# Patient Record
Sex: FEMALE | Race: WHITE | HISPANIC OR LATINO | Employment: FULL TIME | URBAN - METROPOLITAN AREA
[De-identification: names, ages, dates, MRNs, and addresses within clinical notes are randomized per-mention and may not be internally consistent; named-entity substitution may affect disease eponyms.]

---

## 2017-01-20 ENCOUNTER — HOSPITAL ENCOUNTER (EMERGENCY)
Facility: OTHER | Age: 62
Discharge: HOME | End: 2017-01-20
Attending: EMERGENCY MEDICINE
Payer: COMMERCIAL

## 2017-01-20 VITALS
RESPIRATION RATE: 20 BRPM | SYSTOLIC BLOOD PRESSURE: 146 MMHG | HEART RATE: 92 BPM | HEIGHT: 59 IN | WEIGHT: 123 LBS | OXYGEN SATURATION: 97 % | BODY MASS INDEX: 24.8 KG/M2 | DIASTOLIC BLOOD PRESSURE: 89 MMHG | TEMPERATURE: 98 F

## 2017-01-20 DIAGNOSIS — J10.1 INFLUENZA A: Primary | ICD-10-CM

## 2017-01-20 PROCEDURE — 87804 INFLUENZA ASSAY W/OPTIC: CPT

## 2017-01-20 PROCEDURE — 99284 EMERGENCY DEPT VISIT MOD MDM: CPT | Mod: 25

## 2017-01-20 PROCEDURE — 25000003 PHARM REV CODE 250: Performed by: EMERGENCY MEDICINE

## 2017-01-20 RX ORDER — IBUPROFEN 800 MG/1
800 TABLET ORAL EVERY 6 HOURS PRN
Qty: 20 TABLET | Refills: 0 | Status: SHIPPED | OUTPATIENT
Start: 2017-01-20

## 2017-01-20 RX ORDER — OSELTAMIVIR PHOSPHATE 75 MG/1
75 CAPSULE ORAL 2 TIMES DAILY
Qty: 10 CAPSULE | Refills: 0 | Status: SHIPPED | OUTPATIENT
Start: 2017-01-20 | End: 2017-01-25

## 2017-01-20 RX ORDER — CODEINE PHOSPHATE AND GUAIFENESIN 10; 100 MG/5ML; MG/5ML
5 SOLUTION ORAL EVERY 4 HOURS PRN
Qty: 180 ML | Refills: 0 | Status: SHIPPED | OUTPATIENT
Start: 2017-01-20 | End: 2017-01-30

## 2017-01-20 RX ORDER — OSELTAMIVIR PHOSPHATE 75 MG/1
75 CAPSULE ORAL
Status: COMPLETED | OUTPATIENT
Start: 2017-01-20 | End: 2017-01-20

## 2017-01-20 RX ADMIN — OSELTAMIVIR PHOSPHATE 75 MG: 75 CAPSULE ORAL at 06:01

## 2017-01-20 NOTE — ED AVS SNAPSHOT
[facility] EMERGENCY DEPARTMENT  [address] [facility] [address]               Patricia Sales   2017  5:25 PM   ED    Description:  Female : 1955   Department:  [facility]           Your Care was Coordinated By:     Provider Role From To    Ana Laura Sanchez MD Attending Provider 17 1733 --      Reason for Visit     URI           Diagnoses this Visit        Comments    Influenza A    -  Primary       ED Disposition     None           To Do List           Follow-up Information     Schedule an appointment as soon as possible for a visit with Lizzeth Minor MD.    Specialty:  Family Medicine    Contact information:    [address]  [facility] [address]  [phone]          Follow up with [facility].    Specialty:  Emergency Medicine    Why:  If symptoms worsen    Contact information:    [address] [facility] [address]  [phone]       These Medications        Disp Refills Start End    guaifenesin-codeine 100-10 mg/5 ml (TUSSI-ORGANIDIN NR)  mg/5 mL syrup 180 mL 0 2017    Take 5 mLs by mouth every 4 (four) hours as needed for Cough. - Oral    Pharmacy: [facility] [address] [place] [address] AT [facility] #: [phone]       ibuprofen (ADVIL,MOTRIN) 800 MG tablet 20 tablet 0 2017     Take 1 tablet (800 mg total) by mouth every 6 (six) hours as needed for Pain. - Oral    Pharmacy: [facility] [address] AT [facility] #: [phone]       oseltamivir (TAMIFLU) 75 MG capsule 10 capsule 0 2017    Take 1 capsule (75 mg total) by mouth 2 (two) times daily. - Oral    Pharmacy: [facility] [address] AT [facility] #: [phone]         [facility]     [facility] -  Assistance  Registered nurses in the  [facility] provide clinical advisement, health education, appointment booking, and other advisory services.  Call for this free service at [phone].             Medications           Message regarding Medications     Verify the changes and/or additions to your medication regime listed below are the same as discussed with your clinician today.  If any of these changes or additions are incorrect, please notify your healthcare provider.        START taking these NEW medications        Refills    guaifenesin-codeine 100-10 mg/5 ml (TUSSI-ORGANIDIN NR)  mg/5 mL syrup 0    Sig: Take 5 mLs by mouth every 4 (four) hours as needed for Cough.    Class: Print    Route: Oral    ibuprofen (ADVIL,MOTRIN) 800 MG tablet 0    Sig: Take 1 tablet (800 mg total) by mouth every 6 (six) hours as needed for Pain.    Class: Print    Route: Oral    oseltamivir (TAMIFLU) 75 MG capsule 0    Sig: Take 1 capsule (75 mg total) by mouth 2 (two) times daily.    Class: Print    Route: Oral      These medications were administered today        Dose Freq    oseltamivir capsule 75 mg 75 mg ED 1 Time    Sig: Take 1 capsule (75 mg total) by mouth ED 1 Time.    Class: Normal    Route: Oral           Verify that the below list of medications is an accurate representation of the medications you are currently taking.  If none reported, the list may be blank. If incorrect, please contact your healthcare provider. Carry this list with you in case of emergency.           Current Medications     acetaminophen (TYLENOL) 500 MG tablet Take 2 tablets (1,000 mg total) by mouth 3 (three) times daily as needed for Pain.    CALCIUM CARBONATE (CALCIUM 500 ORAL) Take by mouth.    guaifenesin-codeine 100-10 mg/5 ml (TUSSI-ORGANIDIN NR)  mg/5 mL syrup Take 5 mLs by mouth every 4 (four) hours as needed for Cough.    hydrophilic cream-urea Lotn Apply cream to the sole of both feet twice daily.    ibuprofen (ADVIL,MOTRIN) 800 MG tablet Take 1  "tablet (800 mg total) by mouth every 6 (six) hours as needed for Pain.    oseltamivir (TAMIFLU) 75 MG capsule Take 1 capsule (75 mg total) by mouth 2 (two) times daily.    oseltamivir capsule 75 mg Take 1 capsule (75 mg total) by mouth ED 1 Time.           Clinical Reference Information           Your Vitals Were     BP Pulse Temp Resp Height Weight    146/89 (BP Location: Left arm, Patient Position: Sitting) 92 97.6 °F (36.4 °C) (Temporal) 20 4' 11" (1.499 m) 55.8 kg (123 lb)    SpO2 BMI             97% 24.84 kg/m2         Allergies as of 1/20/2017        Reactions    Demerol [Meperidine] Other (See Comments)    Loss of consciousness      Immunizations Administered on Date of Encounter - 1/20/2017     None      ED Micro, Lab, POCT     Start Ordered       Status Ordering Provider    01/20/17 1706 01/20/17 1705  POCT Influenza A/B  Once      Ordered       ED Imaging Orders     None        Discharge Instructions         Influenza  Influenza (the flu) is an infection that affects your respiratory tract (the mouth, nose, and lungs, and the passages between them). Unlike a cold, the flu can make you very ill. And it can lead to pneumonia, a serious lung infection. For some people, especially older adults, young children, and people with certain chronic conditions, the flu can have serious complications and even be fatal.  What Are the Risk Factors for the Flu?     Viruses that cause influenza spread through the air in droplets when someone who has the flu coughs, sneezes, laughs, or talks.   Anyone can get the flu. But youre more likely to become infected if you:  · Have a weakened immune system.  · Work in a health care setting where you may be exposed to flu germs.  · Live or work with someone who has the flu.  · Havent received an annual flu shot.  How Does the Flu Spread?  The flu is caused by viruses. The viruses spread through the air in droplets when someone who has the flu coughs, sneezes, laughs, or talks. " You can become infected when you inhale these viruses directly. You can also become infected when you touch a surface on which the droplets have landed and then transfer the germs to your eyes, nose, or mouth. Touching used tissues, or sharing utensils, drinking glasses, or a toothbrush with an infected person can expose you to flu viruses, too.  What Are the Symptoms of the Flu?  Flu symptoms tend to come on quickly and may last a few days to a few weeks. They include:  · Fever usually higher than 101°F  (38.3°C) and chills  · Sore throat and headache  · Dry cough  · Runny nose  · Tiredness and weakness  · Muscle aches  Factors That Can Make Flu Worse  For some people, the flu can be very serious. The risk of complications is greater for:  · Children under age 5.  · Adults 65 years of age and older.  · People with a chronic illness, such as diabetes or heart, kidney, or lung disease.  · People who live in a nursing home or long-term care facility.   How Is the Flu Treated?  Influenza usually improves after 7 days or so. In some cases, your health care provider may prescribe an antiviral medication. This may help you get well sooner. For the medication to help, you need to take it as soon as possible (ideally within 48 hours) after your symptoms start. If you develop pneumonia or other serious illness, hospital care may be needed.  Easing Flu Symptoms  · Drink lots of fluids such as water, juice, and warm soup. A good rule is to drink enough so that you urinate your normal amount.  · Get plenty of rest.  · Ask your health care provider what to take for fever and pain.  · Call your provider if your fever rises over 101°F (38.3°C) or you become dizzy, lightheaded, or short of breath.  Taking Steps to Protect Others  · Wash your hands often, especially after coughing or sneezing. Or, clean your hands with an alcohol-based hand  containing at least 60 percent alcohol.  · Cough or sneeze into a tissue. Then throw  the tissue away and wash your hands. If you dont have a tissue, cough and sneeze into the crook of your elbow.  · Stay home until at least 24 hours after you no longer have a fever or chills. Be sure the fever isnt being hidden by fever-reducing medication.  · Dont share food, utensils, drinking glasses, or a toothbrush with others.  · Ask your health care provider if others in your household should receive antiviral medication to help them avoid infection.  How Can the Flu Be Prevented?  · One of the best ways to avoid the flu is to get a flu vaccination each year. Viruses that cause the flu change from year to year. For that reason, doctors recommend getting the flu vaccine each year, as soon as it's available in your area. The vaccine may be given as a shot or as a nasal spray. Your health care provider can tell you which vaccine is right for you.  · Wash your hands often. Frequent handwashing is a proven way to help prevent infection.  · Carry an alcohol-based hand gel containing at least 60 percent alcohol. Use it when you dont have access to soap and water. Then wash your hands as soon as you can.  · Avoid touching your eyes, nose, and mouth.  · At home and work, clean phones, computer keyboards, and toys often with disinfectant wipes.  · If possible, avoid close contact with others who have the flu or symptoms of the flu.  Handwashing Tips  Handwashing is one of the best ways to prevent many common infections. If youre caring for or visiting someone with the flu, wash your hands each time you enter and leave the room. Follow these steps:  · Use warm water and plenty of soap. Rub your hands together well.  · Clean the whole hand, under your nails, between your fingers, and up the wrists.  · Wash for at least 15 seconds.  · Rinse, letting the water run down your fingers, not up your wrists.  · Dry your hands well. Use a paper towel to turn off the faucet and open the door.  Using Alcohol-Based Hand    Alcohol-based hand  are also a good choice. Use them when you dont have access to soap and water. Follow these steps:  · Squeeze about a tablespoon of gel into the palm of one hand.  · Rub your hands together briskly, cleaning the backs of your hands, the palms, between your fingers, and up the wrists.  · Rub until the gel is gone and your hands are completely dry.  Preventing Influenza in Healthcare Settings  The flu is a special concern for people in hospitals and long-term care facilities. To help prevent the spread of flu, many hospitals and nursing homes take these steps:  · Health care providers wash their hands or use an alcohol-based hand  before and after treating each patient.  · People with the flu have private rooms and bathrooms or share a room with someone with the same infection.  · High-risk patients who dont have the flu are encouraged to get the flu and pneumonia vaccines.  · All health care workers are encouraged or required to get flu shots.      © 3120-1286 [employer]. [address] [place] [address]. All rights reserved. This information is not intended as a substitute for professional medical care. Always follow your healthcare professional's instructions.           [facility] complies with applicable Federal civil rights laws and does not discriminate on the basis of race, color, national origin, age, disability, or sex.        Language Assistance Services     ATTENTION: Language assistance services are available, free of charge. Please call [phone].      ATENCIÓN: Si habla fitz, tiene a sol disposición servicios gratuitos de asistencia lingüística. Llame al [phone].     CHÚ Ý: N?u b?n nói Ti?ng Vi?t, có các d?ch v? h? tr? ngôn ng? mi?n phí dành cho b?n. G?i s? [phone].

## 2017-01-20 NOTE — ED TRIAGE NOTES
Pt thinks she has the flu. States she has body aches, cough, sore throat, earache. Pt states her grandson has the flu. Pt has no other complaints

## 2017-01-21 NOTE — ED PROVIDER NOTES
Encounter Date: 1/20/2017       History     Chief Complaint   Patient presents with    URI     Pt c/o of uri S&S  and body aches x 1 day     Review of patient's allergies indicates:   Allergen Reactions    Demerol [meperidine] Other (See Comments)     Loss of consciousness     The history is provided by the patient.    this a 61-year-old who complains of flulike symptoms since yesterday.  Patient reports headache, nasal congestion, sore throat, ear pain, cough, body aches.  Patient has been taking over-the-counter medicines including DayQuil, NyQuil, and ibuprofen.  Her grandson was diagnosed with influenza.  Patient denies fever.  She has had chills.  There are no aggravating or alleviating factors for her symptoms.  Her pain is moderate and constant.  Past Medical History   Diagnosis Date    Allergy     Hiatal hernia     Osteoporosis     Vitamin D deficiency disease      Past Medical History Pertinent Negatives   Diagnosis Date Noted    Diabetes mellitus 9/8/2015    Hypertension 9/8/2015     Past Surgical History   Procedure Laterality Date    Bilateral tubal ligation      Cataract surgery Right      Family History   Problem Relation Age of Onset    Ulcers Mother      bleeding    Raynaud syndrome Mother     Breast cancer Neg Hx     Colon cancer Neg Hx     Ovarian cancer Maternal Aunt      Social History   Substance Use Topics    Smoking status: Never Smoker    Smokeless tobacco: None    Alcohol use Yes      Comment: Occasional     Review of Systems   Constitutional: Positive for chills. Negative for fever.   HENT: Positive for congestion, ear pain and sinus pressure. Negative for sore throat.    Eyes: Negative for visual disturbance.   Respiratory: Positive for cough. Negative for shortness of breath.    Cardiovascular: Negative for chest pain.   Gastrointestinal: Negative for abdominal pain, diarrhea and vomiting.   Genitourinary: Negative for dysuria.   Musculoskeletal: Positive for myalgias.  Negative for neck pain and neck stiffness.   Skin: Negative for rash.   Neurological: Positive for headaches.       Physical Exam   Initial Vitals   BP Pulse Resp Temp SpO2   01/20/17 1702 01/20/17 1702 01/20/17 1702 01/20/17 1702 01/20/17 1702   146/89 92 20 97.6 °F (36.4 °C) 97 %     Physical Exam    Nursing note and vitals reviewed.  Constitutional: She appears well-developed and well-nourished.   HENT:   Head: Normocephalic and atraumatic.   Right Ear: Tympanic membrane normal.   Left Ear: Tympanic membrane normal.   Nose: Mucosal edema present.   Mouth/Throat: Oropharynx is clear and moist. No oropharyngeal exudate or posterior oropharyngeal edema.   Eyes: Conjunctivae and EOM are normal. Pupils are equal, round, and reactive to light.   Neck: Normal range of motion and full passive range of motion without pain. Neck supple.   Cardiovascular: Normal rate and regular rhythm.   Pulmonary/Chest: Breath sounds normal.   Abdominal: Soft. There is no tenderness. There is no rebound and no guarding.   Musculoskeletal: Normal range of motion.   Neurological: She is alert and oriented to person, place, and time. She has normal strength.   Skin: Skin is warm and dry.   Psychiatric: She has a normal mood and affect.         ED Course   Procedures  Labs Reviewed   POCT INFLUENZA A/B             Medical Decision Making:   Initial Assessment:    61-year-old who complains of flulike symptoms since yesterday.  On exam patient is afebrile and nontoxic..  Lungs are clear  ED Management:  Patient was positive for influenza A.  She does have sick contacts with  The same diagnosis..  She was given Tamiflu and  will be discharged on Tamiflu, guaifenesin with codeine, and ibuprofen.  She was instructed that she must wear a mask around her  who has cancer.  She was also advised to call her 's doctor to see about prescribing him Tamiflu.                   ED Course     Clinical Impression:   The encounter diagnosis was  Influenza A.          Ana Laura Sanchez MD  01/20/17 1091

## 2017-01-21 NOTE — DISCHARGE INSTRUCTIONS
Influenza  Influenza (the flu) is an infection that affects your respiratory tract (the mouth, nose, and lungs, and the passages between them). Unlike a cold, the flu can make you very ill. And it can lead to pneumonia, a serious lung infection. For some people, especially older adults, young children, and people with certain chronic conditions, the flu can have serious complications and even be fatal.  What Are the Risk Factors for the Flu?     Viruses that cause influenza spread through the air in droplets when someone who has the flu coughs, sneezes, laughs, or talks.   Anyone can get the flu. But youre more likely to become infected if you:  · Have a weakened immune system.  · Work in a health care setting where you may be exposed to flu germs.  · Live or work with someone who has the flu.  · Havent received an annual flu shot.  How Does the Flu Spread?  The flu is caused by viruses. The viruses spread through the air in droplets when someone who has the flu coughs, sneezes, laughs, or talks. You can become infected when you inhale these viruses directly. You can also become infected when you touch a surface on which the droplets have landed and then transfer the germs to your eyes, nose, or mouth. Touching used tissues, or sharing utensils, drinking glasses, or a toothbrush with an infected person can expose you to flu viruses, too.  What Are the Symptoms of the Flu?  Flu symptoms tend to come on quickly and may last a few days to a few weeks. They include:  · Fever usually higher than 101°F  (38.3°C) and chills  · Sore throat and headache  · Dry cough  · Runny nose  · Tiredness and weakness  · Muscle aches  Factors That Can Make Flu Worse  For some people, the flu can be very serious. The risk of complications is greater for:  · Children under age 5.  · Adults 65 years of age and older.  · People with a chronic illness, such as diabetes or heart, kidney, or lung disease.  · People who live in a nursing  home or long-term care facility.   How Is the Flu Treated?  Influenza usually improves after 7 days or so. In some cases, your health care provider may prescribe an antiviral medication. This may help you get well sooner. For the medication to help, you need to take it as soon as possible (ideally within 48 hours) after your symptoms start. If you develop pneumonia or other serious illness, hospital care may be needed.  Easing Flu Symptoms  · Drink lots of fluids such as water, juice, and warm soup. A good rule is to drink enough so that you urinate your normal amount.  · Get plenty of rest.  · Ask your health care provider what to take for fever and pain.  · Call your provider if your fever rises over 101°F (38.3°C) or you become dizzy, lightheaded, or short of breath.  Taking Steps to Protect Others  · Wash your hands often, especially after coughing or sneezing. Or, clean your hands with an alcohol-based hand  containing at least 60 percent alcohol.  · Cough or sneeze into a tissue. Then throw the tissue away and wash your hands. If you dont have a tissue, cough and sneeze into the crook of your elbow.  · Stay home until at least 24 hours after you no longer have a fever or chills. Be sure the fever isnt being hidden by fever-reducing medication.  · Dont share food, utensils, drinking glasses, or a toothbrush with others.  · Ask your health care provider if others in your household should receive antiviral medication to help them avoid infection.  How Can the Flu Be Prevented?  · One of the best ways to avoid the flu is to get a flu vaccination each year. Viruses that cause the flu change from year to year. For that reason, doctors recommend getting the flu vaccine each year, as soon as it's available in your area. The vaccine may be given as a shot or as a nasal spray. Your health care provider can tell you which vaccine is right for you.  · Wash your hands often. Frequent handwashing is a proven way  to help prevent infection.  · Carry an alcohol-based hand gel containing at least 60 percent alcohol. Use it when you dont have access to soap and water. Then wash your hands as soon as you can.  · Avoid touching your eyes, nose, and mouth.  · At home and work, clean phones, computer keyboards, and toys often with disinfectant wipes.  · If possible, avoid close contact with others who have the flu or symptoms of the flu.  Handwashing Tips  Handwashing is one of the best ways to prevent many common infections. If youre caring for or visiting someone with the flu, wash your hands each time you enter and leave the room. Follow these steps:  · Use warm water and plenty of soap. Rub your hands together well.  · Clean the whole hand, under your nails, between your fingers, and up the wrists.  · Wash for at least 15 seconds.  · Rinse, letting the water run down your fingers, not up your wrists.  · Dry your hands well. Use a paper towel to turn off the faucet and open the door.  Using Alcohol-Based Hand   Alcohol-based hand  are also a good choice. Use them when you dont have access to soap and water. Follow these steps:  · Squeeze about a tablespoon of gel into the palm of one hand.  · Rub your hands together briskly, cleaning the backs of your hands, the palms, between your fingers, and up the wrists.  · Rub until the gel is gone and your hands are completely dry.  Preventing Influenza in Healthcare Settings  The flu is a special concern for people in hospitals and long-term care facilities. To help prevent the spread of flu, many hospitals and nursing homes take these steps:  · Health care providers wash their hands or use an alcohol-based hand  before and after treating each patient.  · People with the flu have private rooms and bathrooms or share a room with someone with the same infection.  · High-risk patients who dont have the flu are encouraged to get the flu and pneumonia  vaccines.  · All health care workers are encouraged or required to get flu shots.      © 2239-4687 [employer]. [address] [place] [address], [place] [address]. All rights reserved. This information is not intended as a substitute for professional medical care. Always follow your healthcare professional's instructions.

## 2017-01-23 ENCOUNTER — HOSPITAL ENCOUNTER (EMERGENCY)
Facility: OTHER | Age: 62
Discharge: HOME | End: 2017-01-23
Attending: EMERGENCY MEDICINE
Payer: COMMERCIAL

## 2017-01-23 VITALS
BODY MASS INDEX: 24.8 KG/M2 | OXYGEN SATURATION: 100 % | TEMPERATURE: 98 F | HEART RATE: 87 BPM | WEIGHT: 123 LBS | HEIGHT: 59 IN | DIASTOLIC BLOOD PRESSURE: 75 MMHG | SYSTOLIC BLOOD PRESSURE: 168 MMHG | RESPIRATION RATE: 16 BRPM

## 2017-01-23 DIAGNOSIS — R13.10 DYSPHAGIA: ICD-10-CM

## 2017-01-23 PROCEDURE — 99283 EMERGENCY DEPT VISIT LOW MDM: CPT

## 2017-01-23 PROCEDURE — 25000003 PHARM REV CODE 250: Performed by: EMERGENCY MEDICINE

## 2017-01-23 RX ADMIN — LIDOCAINE HYDROCHLORIDE: 20 SOLUTION ORAL; TOPICAL at 06:01

## 2017-01-23 NOTE — ED AVS SNAPSHOT
[facility] EMERGENCY DEPARTMENT  [address] [place] [address]               Patricia Sales   2017  3:37 PM   ED    Description:  Female : 1955   Department:  [facility]           Your Care was Coordinated By:     Provider Role From To    Ana Laura Sanchez MD Attending Provider [id number] --      Reason for Visit     Cough           Diagnoses this Visit        Comments    Dysphagia           ED Disposition     ED Disposition Condition Comment    Discharge             To Do List           Follow-up Information     Follow up with Lizzeth Minor MD. Schedule an appointment as soon as possible for a visit in 1 day.    Specialty:  Family Medicine    Contact information:    [address] [place] [address]  [phone]        [facility] On Call     [facility] On Call Nurse Care Line -  Assistance  Registered nurses in the [facility] On Call Center provide clinical advisement, health education, appointment booking, and other advisory services.  Call for this free service at [phone].             Medications           Message regarding Medications     Verify the changes and/or additions to your medication regime listed below are the same as discussed with your clinician today.  If any of these changes or additions are incorrect, please notify your healthcare provider.        These medications were administered today        Dose Freq    (pyxis) gi cocktail (mylanta 30 mL, lidocaine 2 % viscous 10 mL, dicyclomine 10 mL) 50 mL  ED 1 Time    Sig: Take by mouth ED 1 Time.    Class: Normal    Route: Oral           Verify that the below list of medications is an accurate representation of the medications you are currently taking.  If none reported, the list may be blank. If incorrect, please contact your healthcare provider. Carry this list with you in case of emergency.           Current Medications     acetaminophen (TYLENOL) 500 MG tablet Take 2 tablets  "(1,000 mg total) by mouth 3 (three) times daily as needed for Pain.    CALCIUM CARBONATE (CALCIUM 500 ORAL) Take by mouth.    guaifenesin-codeine 100-10 mg/5 ml (TUSSI-ORGANIDIN NR)  mg/5 mL syrup Take 5 mLs by mouth every 4 (four) hours as needed for Cough.    hydrophilic cream-urea Lotn Apply cream to the sole of both feet twice daily.    ibuprofen (ADVIL,MOTRIN) 800 MG tablet Take 1 tablet (800 mg total) by mouth every 6 (six) hours as needed for Pain.    oseltamivir (TAMIFLU) 75 MG capsule Take 1 capsule (75 mg total) by mouth 2 (two) times daily.           Clinical Reference Information           Your Vitals Were     BP Pulse Temp Resp Height Weight    168/75 87 98.4 °F (36.9 °C) (Oral) 16 4' 11" (1.499 m) 55.8 kg (123 lb)    SpO2 BMI             100% 24.84 kg/m2         Allergies as of 1/23/2017        Reactions    Demerol [Meperidine] Other (See Comments)    Loss of consciousness      Immunizations Administered on Date of Encounter - 1/23/2017     None      ED Micro, Lab, POCT     None      ED Imaging Orders     Start Ordered       Status Ordering Provider    01/23/17 1714 01/23/17 1714  X-Ray Neck Soft Tissue  1 time imaging      Final result         Discharge Instructions       MIX MAALOX ( 1 TABLESPOON) AND LIQUID BENADRYL (1 TABLESPOON) AND SWALLOW EVERY 4-6 HOURS AS NEEDED FOR THROAT DISCOMFORT    Soft Diet  Your health care provider has prescribed a soft diet (also called gastrointestinal soft diet). This means eating foods that are soft in texture, low in fiber, and easily digested. This diet is for people with digestive problems. A soft diet provides foods that are easy to chew and swallow. Foods should be bite-size and very soft or moist so they are easy to swallow. Follow any specific instructions from your health care provider about foods and beverages you can and cannot have. The general guidelines below can help you get started on this diet.       Beverages  OK: Milk, tea, coffee, fruit " juices, carbonated beverages, nutrition shakes and drinks (Note: Thin liquids might be difficult to swallow and may require thickening.)   Avoid: None, except if they need to be thickened  Breads and crackers  OK: Refined white, wheat, or seedless rye bread, pat or soda crackers that have been moistened, plain rolls or bagels, very soft tortillas  Avoid: Whole-grain breads, rolls, or bagels with nuts, raisins, or seeds, crackers, croutons, taco shells  Cereals and grains  OK: Cooked cereals, plain dry cereals that have been moistened, plain macaroni, spaghetti, noodles, rice  Avoid: Whole-grain cereals and granola, or cereals containing bran, raisins, seeds or nuts, coconut; brown or wild rice  Fruits  OK: Avocado, banana, baked peeled apple, applesauce, peeled ripe peaches or pears, canned fruit (apricots, cherries, peaches, pears), melons  Avoid: Raw apple, dried fruits, coconut, pineapple, grapes, fruit joesph, fruit snacks  Meat and fish  OK: All fresh meat, poultry, or fish that is cooked until tender  Avoid: Meat, fish, or poultry that is fried; tough or stringy meat including villa, sausage, bratwurst, jerky, corned beef  Eggs and cheese  OK: Poached or scrambled eggs, cottage cheese, ricotta cheese, cream cheese, cheese sauces, or cheese melted in other dishes  Avoid: Crisp fried eggs, cheese slices and cubes  Other protein foods  OK: Tofu, baked beans, smooth peanut butter or other nut or seed butters  Avoid: Deep-fried tofu, crunchy peanut or other nut or seed butters, nuts or seeds that are whole or chopped  Soups  OK: All soups, but they may need to be thickened if the thin liquid is too difficult to swallow  Avoid: Soups made with stringy meat pieces or chunky vegetables  Vegetables  OK: Peeled and well-cooked potatoes or sweet potatoes; fresh, cooked, canned, or frozen vegetables without seeds, skin, or coarse fiber  Avoid: Raw vegetables, deep-fried vegetables (such as tempura); corn  Desserts  and sweets  OK: Moist cake, soft fruit pie with bottom crust only, soft cookies moistened in milk or other liquid, gelatin, custard, pudding, plain ice cream, plain sherbet, sugar, honey, clear jelly  Avoid: Pastries, desserts, and ice cream that have nuts, coconut, seeds, or dried fruit; popcorn, chips of any kind including potato and taco chips; jam, marmalade  © 0772-0760 [employer]. [address] [place] [address]. All rights reserved. This information is not intended as a substitute for professional medical care. Always follow your healthcare professional's instructions.      TABLESPOON) AND SWALLOW EVERY 4-6 HOURS FOR COMFORT.      [facility] Emergency Department complies with applicable Federal civil rights laws and does not discriminate on the basis of race, color, national origin, age, disability, or sex.        Language Assistance Services     ATTENTION: Language assistance services are available, free of charge. Please call [phone].      ATENCIÓN: Si habla fitz, tiene a sol disposición servicios gratuitos de asistencia lingüística. Llame al [phone].     VIDAL Ý: N?u b?n nói Ti?ng Vi?t, có các d?ch v? h? tr? ngôn ng? mi?n phí dành cho b?n. G?i s? [phone].

## 2017-01-23 NOTE — ED TRIAGE NOTES
"Pt c/o sore throat & cough.  States "I feel like my tract is closing on me, I can only eat liquids".  Pt also c/o weakness.  Pt was seen here on Friday and dx with the flu, states she has been taking Tamiflu.  "

## 2017-01-23 NOTE — ED PROVIDER NOTES
Encounter Date: 1/23/2017       History     Chief Complaint   Patient presents with    Cough     Review of patient's allergies indicates:   Allergen Reactions    Demerol [meperidine] Other (See Comments)     Loss of consciousness     The history is provided by the patient and medical records.   61 who complains of a sore throat.  Patient says that she is having trouble swallowing.  This began today.  Patient was seen here a few days ago and diagnosed with influenza A.  She said that her fever has improved but she continues to have cough productive of yellow sputum, nasal congestion and posttussive emesis.  She says she feels like something is stuck in her throat.  She is able to drink liquids but has pain when she eats solids.  Past Medical History   Diagnosis Date    Allergy     Hiatal hernia     Osteoporosis     Vitamin D deficiency disease      Past Medical History Pertinent Negatives   Diagnosis Date Noted    Diabetes mellitus 9/8/2015    Hypertension 9/8/2015     Past Surgical History   Procedure Laterality Date    Bilateral tubal ligation      Cataract surgery Right      Family History   Problem Relation Age of Onset    Ulcers Mother      bleeding    Raynaud syndrome Mother     Ovarian cancer Maternal Aunt     Breast cancer Neg Hx     Colon cancer Neg Hx      Social History   Substance Use Topics    Smoking status: Never Smoker    Smokeless tobacco: None    Alcohol use Yes      Comment: Occasional     Review of Systems   Constitutional: Negative for fever.   HENT: Positive for congestion and trouble swallowing.    Respiratory: Positive for cough.    Gastrointestinal: Positive for vomiting (posttussive).   Genitourinary: Negative for decreased urine volume and dysuria.   Neurological: Negative for headaches.   All other systems reviewed and are negative.      Physical Exam   Initial Vitals   BP Pulse Resp Temp SpO2   01/23/17 1539 01/23/17 1539 01/23/17 1539 01/23/17 1539 01/23/17 1539    168/75 87 16 98.4 °F (36.9 °C) 100 %     Physical Exam    Nursing note and vitals reviewed.  Constitutional: She appears well-developed and well-nourished.   HENT:   Head: Normocephalic and atraumatic.   Mouth/Throat: No oropharyngeal exudate.   No hoarseness   Eyes: Conjunctivae and EOM are normal. Pupils are equal, round, and reactive to light.   Neck: Normal range of motion. Neck supple. No tracheal deviation present.   Cardiovascular: Normal rate and regular rhythm.   Pulmonary/Chest: Breath sounds normal. No stridor.   Abdominal: Soft. There is no tenderness. There is no rebound and no guarding.   Musculoskeletal: Normal range of motion.   Neurological: She is alert and oriented to person, place, and time. She has normal strength.   Skin: Skin is warm and dry.   Psychiatric: She has a normal mood and affect.         ED Course   Procedures  Labs Reviewed - No data to display          Medical Decision Making:   Initial Assessment:   Patient is complaining of dysphagia with eating solid foods and feeling that something is stuck in her throat.  Patient has no stridor.  She is not hoarse.  Throat is clear.  No swelling to the neck  Clinical Tests:   Radiological Study: Ordered and Reviewed  ED Management:  Soft tissue lateral of the neck was done.  This showed a laryngocele but no acute findings.  Patient was treated with a GI cocktail.  She was advised to mix Benadryl and Maalox together and swallow as needed for discomfort.  I do not suspect airway compromise or esophageal blockage.  She was also reminded that influenza can take up to 10 days to resolve                    ED Course     Clinical Impression:   The encounter diagnosis was Dysphagia.          Ana Laura Sanchez MD  01/23/17 2025

## 2017-01-23 NOTE — ED NOTES
Appearance:  Pt awake, alert & oriented to person, place & time.  Pt in no acute distress at present time.  Skin:  Skin warm, dry & intact.  Mucous membranes moist.  Skin turgor normal.  Respiratory:  Respirations even, non-labored.  (+) cough  Neurologic:  Pt moving all extremities without difficulty.  Sensation intact.     Peripheral Vascular:  All peripheral pulses present.  ENT:  Tonsils swollen & red.

## 2017-08-25 DIAGNOSIS — Z12.11 COLON CANCER SCREENING: ICD-10-CM

## 2021-04-15 ENCOUNTER — PATIENT MESSAGE (OUTPATIENT)
Dept: RESEARCH | Facility: HOSPITAL | Age: 66
End: 2021-04-15

## 2021-10-26 ENCOUNTER — TELEPHONE (OUTPATIENT)
Dept: UROGYNECOLOGY | Facility: CLINIC | Age: 66
End: 2021-10-26
Payer: COMMERCIAL

## 2022-01-04 ENCOUNTER — TELEPHONE (OUTPATIENT)
Dept: UROGYNECOLOGY | Facility: CLINIC | Age: 67
End: 2022-01-04
Payer: COMMERCIAL

## 2022-01-04 NOTE — TELEPHONE ENCOUNTER
Attempted to contact pt regarding her scheduled missed appointment today 1/4/2022 at 3p. Pt did not answer. Lm to call office

## 2024-04-30 NOTE — DISCHARGE INSTRUCTIONS
MIX MAALOX ( 1 TABLESPOON) AND LIQUID BENADRYL (1 TABLESPOON) AND SWALLOW EVERY 4-6 HOURS AS NEEDED FOR THROAT DISCOMFORT    Soft Diet  Your health care provider has prescribed a soft diet (also called gastrointestinal soft diet). This means eating foods that are soft in texture, low in fiber, and easily digested. This diet is for people with digestive problems. A soft diet provides foods that are easy to chew and swallow. Foods should be bite-size and very soft or moist so they are easy to swallow. Follow any specific instructions from your health care provider about foods and beverages you can and cannot have. The general guidelines below can help you get started on this diet.       Beverages  OK: Milk, tea, coffee, fruit juices, carbonated beverages, nutrition shakes and drinks (Note: Thin liquids might be difficult to swallow and may require thickening.)   Avoid: None, except if they need to be thickened  Breads and crackers  OK: Refined white, wheat, or seedless rye bread, pat or soda crackers that have been moistened, plain rolls or bagels, very soft tortillas  Avoid: Whole-grain breads, rolls, or bagels with nuts, raisins, or seeds, crackers, croutons, taco shells  Cereals and grains  OK: Cooked cereals, plain dry cereals that have been moistened, plain macaroni, spaghetti, noodles, rice  Avoid: Whole-grain cereals and granola, or cereals containing bran, raisins, seeds or nuts, coconut; brown or wild rice  Fruits  OK: Avocado, banana, baked peeled apple, applesauce, peeled ripe peaches or pears, canned fruit (apricots, cherries, peaches, pears), melons  Avoid: Raw apple, dried fruits, coconut, pineapple, grapes, fruit joesph, fruit snacks  Meat and fish  OK: All fresh meat, poultry, or fish that is cooked until tender  Avoid: Meat, fish, or poultry that is fried; tough or stringy meat including villa, sausage, bratwurst, jerky, corned beef  Eggs and cheese  OK: Poached or scrambled eggs, cottage cheese,  Number of grounding pads used: 1  Location of grounding pads: right thigh ricotta cheese, cream cheese, cheese sauces, or cheese melted in other dishes  Avoid: Crisp fried eggs, cheese slices and cubes  Other protein foods  OK: Tofu, baked beans, smooth peanut butter or other nut or seed butters  Avoid: Deep-fried tofu, crunchy peanut or other nut or seed butters, nuts or seeds that are whole or chopped  Soups  OK: All soups, but they may need to be thickened if the thin liquid is too difficult to swallow  Avoid: Soups made with stringy meat pieces or chunky vegetables  Vegetables  OK: Peeled and well-cooked potatoes or sweet potatoes; fresh, cooked, canned, or frozen vegetables without seeds, skin, or coarse fiber  Avoid: Raw vegetables, deep-fried vegetables (such as tempura); corn  Desserts and sweets  OK: Moist cake, soft fruit pie with bottom crust only, soft cookies moistened in milk or other liquid, gelatin, custard, pudding, plain ice cream, plain sherbet, sugar, honey, clear jelly  Avoid: Pastries, desserts, and ice cream that have nuts, coconut, seeds, or dried fruit; popcorn, chips of any kind including potato and taco chips; jam, marmalade  © 4869-5876 [employer]. [address] [place] [address], [place] [address]. All rights reserved. This information is not intended as a substitute for professional medical care. Always follow your healthcare professional's instructions.      TABLESPOON) AND SWALLOW EVERY 4-6 HOURS FOR COMFORT.